# Patient Record
(demographics unavailable — no encounter records)

---

## 2024-11-22 NOTE — ASSESSMENT
[FreeTextEntry1] : Yosef is a 14 y/o male who presents for a follow up s/p diagnosed with ADHD 09/23/24 and starting Concerta.  He does not have school-based accommodations.  He  has been seeing psychiatrist Dr Vikash Pang for several months and has been seeing a therapist Allyssa for 1 year for anxiety and possible unspecified mood disorder. He has history of being bullied in school.  He has  some characteristics of ASD including severe peer difficulties and observed to have flat affect and make very poor eye contact.   We had discussed possible autism features (poor eye contact) versus anxiety symptoms.  Father/patient agreeable to discuss further with his therapist and psychiatrist.

## 2024-11-22 NOTE — QUALITY MEASURES
[Impairment in more than one setting] : Impairment in more than one setting: Yes [Coexisting conditions] : Coexisting conditions: Yes [Medication choices] : Medication choices: Not Applicable [Side effects of medications] : Side effects of medications: Not Applicable Ketoconazole Counseling:   Patient counseled regarding improving absorption with orange juice.  Adverse effects include but are not limited to breast enlargement, headache, diarrhea, nausea, upset stomach, liver function test abnormalities, taste disturbance, and stomach pain.  There is a rare possibility of liver failure that can occur when taking ketoconazole. The patient understands that monitoring of LFTs may be required, especially at baseline. The patient verbalized understanding of the proper use and possible adverse effects of ketoconazole.  All of the patient's questions and concerns were addressed.

## 2024-11-22 NOTE — PLAN
[FreeTextEntry1] : -Concerta  -Psychoeducation provided re: ADHD.   -Discussed management for ADHD including behavioral interventions and medication options. -Discussed stimulant medications; risks, benefits, possible side effects. -Letter to request 504 Plan for school previously provided -Sleep hygiene discussed. -Follow up with psychiatrist and therapist (i.e to work on social skills) -Discussed considering autism evaluation - Consider a neuropsychological evaluation in the future if needed (father spoke to Dr Leon). .   -Follow up in 1 month

## 2024-11-22 NOTE — HISTORY OF PRESENT ILLNESS
[FreeTextEntry1] : Yosef is a 12 y/o male here for a follow up s/p diagnosed with ADHD 09/23/24 and starting Concerta.  He does not have school-based accommodations.  He  has been seeing psychiatrist Dr Vikash Pang for several months and has been seeing a therapist Allyssa for 1 year for anxiety and possible unspecified mood disorder. He has history of being bullied in school.  He has  some characteristics of ASD including severe peer difficulties and observed to have flat affect and make very poor eye contact.   We had discussed possible autism features (poor eye contact) versus anxiety symptoms.  Father/patient agreeable to discuss further with his therapist and psychiatrist.    PLAN FROM PREVIOUS VISIT -Start Concerta 18 mg once daily in the morning.  May increase to 2 X18 mg after 1-2 weeks if not effective.  -Psychoeducation provided re: ADHD.  Resources for education given -Discussed management for ADHD including behavioral interventions and medication options. -Counseling on ADHD medication; stimulants are 1st line treatment. Discussed stimulant medications; risks, benefits, possible side effects. -Letter to request 504 Plan for school provided -Sleep hygiene discussed. -Follow up with psychiatrist and therapist (i.e to work on social skills) -Discussed considering autism evaluation - Consider a neuropsychological evaluation in the future if needed (father spoke to Dr Leon). .   -Follow up in 3 weeks.   INTERIM HPI   HPI FROM VISIT ON 09/23/24  Yosef is a 12 y/o male here for a follow up ADHD evaluation for behavioral and attention concerns.  He does not have school-based accommodations.  He  has been seeing psychiatrist Dr Vikash Pang for several months and has been seeing a therapist Allyssa for 1 year for anxiety and possible unspecified mood disorder. He has history of being bullied in school.  He has severe peer difficulties and observed to make very poor eye contact.   PLAN FROM PREVIOUS VISIT -Psychoeducation provided regarding possible ADHD diagnosis. -[employer] parent and teacher forms to be completed prior to next visit. (father may be able to obtain previous forms done this year) -Family asked to provide school report cards/progress reports -Discussed a neuropsychological evaluation can be requested by school district. -School letter to request neuropsychological testing provided.  -Sleep hygiene discussed. -Follow up with psychiatrist and therapist. -Discussed speaking with therapist re: his impression re: poor eye contact.  -Follow up in 1 ,month.  If meets criteria, father/patient expressed interest to consider ADHD meds)  INTERIM HPI Spoke to father 08/07/24 re neuropsych evaluation. Father reports he spoke to Dr Leon on the phone, who recommended to wait and see if a neuropsychological evaluation is needed as based on history he doesn't have concerns of a learning disability.    Patient reports he has started 8th grade and is already struggling with staying focused in class.  He wants to do well in school, expresses concerns he needs to do well now so he can have a good career.   Reji went to visit his mother in [place] for 1 month over the summer.    Denies feeling overly anxious, except "worries about some things."  Endorses he has not "seen things" in a few months.  He doesn't believe it was hallucinations. He saw shadows behind him when he was very anxious.   He is seeing a therapist regularly and has a follow up with his psychiatrist in October.   He is aware he doesn't make eye contact, and feels it just makes him uncomfortable.   He was open to working on this.   He does not believe he has autism but has considered it.     DEISI SCALES   PARENT: Father (Tej Zheng) Inattention: 4/9 (and 3 occasional) -negative/borderline Hyperactivity: 1/9 - ODD:   0/8 (5 occ) - CD:  1/14 - Anxiety/Depression:  /7 IMPAIRMENTS (score of 4 or 5)   Academic overall performance: No (score of 3= average)    Reading, No (score of 3= average)    Writing No (score of 3= average)    Math: NO (2= ABOVE AVERAGE)   Relationships with parents: No (score of 3= average)                                 siblings: YES (4= SOMEWHAT OF A PROBLEM)                                 peers: YES (4= SOMEWHAT OF A PROBLEM)                                 organized activities/teams: No (score of 3= average) TEACHER:  [employer] 05/24/24 Lula Abdullahi.  Same teacher, gave different scores to same questions, same date 05/24. [employer] Teacher, AND Teacher [employer] form Inattention:  7/9 + and 3/9 - (5 occasional) Hyperactivity:  3/9 - and 1/9 - Hyperactive:  1/9 - ODD/CD  0/10 - Anxiety/depression:   4/7 + POSITIVE IMPAIRMENTS IN ANY OF THE FOLLOWING (academic & Social Performance) 1.Reading (1=EXCELLENT, NO PROBLEM) 2 Writing NO (2= ABOVE AVERAGE) 3.Math (1=EXCELLENT, NO PROBLEM) 4. Relationships with peers YES (4= SOMEWHAT OF A PROBLEM) 5. Following directions YES (4= SOMEWHAT OF A PROBLEM) 6. Disrupting class No (score of 3= average) 7. Assignment completion No (score of 3= average) 8. Organizational skills. YES (4= SOMEWHAT OF A PROBLEM) TEACHER COMMENTS:  "REJI IS A VERY BRIGHT BOY. HE DOES A LOT OF HIS MATH WORK IN HIS HEAD AND MAKES CARELESS MISTAKES DUE TO NOT WANTING TO WRITE DOWN ALL OF HIS WORK. ALSO HE HAS AN EXCELLENT VOCABULARY AND CAN ARTICULATE HIS NEEDS AND WANTS AND ? (EXPLANAT) HE DOES NOT MAKE EYE CONTACT AND AT TIMES "ACTS OUT" FOR ATTENT. HE HAS NEVER BEEN RUDE OR DISRESPECTFUL TO ME. I DO FEEL THAT YOSEF IS EXTREMELY INTELLIGENT BUT DOES NOT COMPLETE HIS WORK AT TIMES."    HPI FROM VISIT ON 08/05/24  Yosef is a 12 y/o male here for an initial visit for inattention and behavioral concerns.   Patient lives with father and brother and attends [employer], mother lives in [place]. They visit infrequently.  He has 11 y/o brother with father, and 2 stepsiblings who live with mother.  Father is an  and has lived with Yosef since he was 5 years old.  He reports he has full custody.  Father was referred by his psychiatrist for a neuropsychological evaluation.   Although father was made aware this clinic is for ADHD evaluation, he endorses possible ADHD concerns, and that the psychiatrist is trying to "figure everything out."   Yosef has been seeing psychiatrist Dr Vikash Pang X 2 months and has been seeing a therapist Allyssa for 1 year.  He endorses was doing well in school until 6th grade, after he came back from visiting his mother in [place].  He was eating paper out of his notebook, was being bullied in school, did not want to go to school, he told therapist he was seeing things, and once verbalized his wanted to hurt his father.  He doesn't look people in the eye, which father felt was a not noticed before this time.   Father recalls Yosef was always a good student, and would report he would get distracted, talk in class. He sees the  at school once a week, but Yosef doesn't connect with her. On a long car ride his older brother noticed he always did a motion of wiping his hand on his face, though doesn't recall a history of tics.  Father feels he can't really tell if Reji has problems with focusing and multitasking at home.  He works all day and when he gets home he doesn't put a lot of demands on him.  Yosef was observed to not make eye contact, though was responsive to all questions asked.  He spoke in a steady, calm voice, and mood appeared depressed. He did not smile. He expressed he feels he had a hard time focusing in the class, and it was always hard to focus.  He gets easily distracted if people are talking.  He procrastinates but still tries to do what is asked for school. He does his homework before the class.  If he has an essay, he does it at home and needs to stay up late because he gets distracted and starts to play on-line chess or another activity.  Has a lot of missing assignments.  When taking a test, sometimes he finishes quickly, other times he needs an extra period to complete it.  Sometimes he feels overwhelmed and wants to throw his book down in frustration.   Unless he is "super interested" in a topic, it takes extra-long to do it.  He had  76% grade overall in last marking period, previously was 71%.  His best friend motivated him to do his work.   He feels it's hard to stay still.  When he is sitting down, he feels restless.  He is disorganized and it stresses him out to see things all over, and he wants to "restart" it.   He has a best in high school.  He is ok with just this friend because it's a good friend.  His friend has better social skills.  Yosef, endorse he doesn't look people in the eye because it makes him uncomfortable.  He is not longer overtly bullied but feels somewhat treated differently.  He denies sensitivities to sound or noise.  He feels how he could put himself in other people's shoes. He only feels bad for people he knows.  His principal told him to look him in the eye when he speaks to him.  Reji finds it very uncomfortable to look someone in the eye.     DEVELOPMENTAL HX  Child was born full term without complications and reached all age-appropriate developmental milestones without concerns. Early Intervention Services were not needed.   EDUCATIONAL HISTORY   Elementary School; Good student, no concerns. Middle school:  Grade started to decline, bullied in school.    HOME BEHAVIORS:   -Attention: Father denies noticing if patient is very easily distracted, needs frequent redirection, and has difficulty with multi-step instructions, however feels he places no demands on him.   -Hyperactivity: Fidgety -Impulsivity:  Is not aggressive, does not interrupts when others are speaking. -Organization: Has difficulty staying organized, often loses things.  His school bag is very disorganized.       CURRENT SCHOOL -School: [employer] , 8th grade in Sept  -Public school  -Special Ed services-No  504 plan or IEP - General education classroom -Accommodations- Counseling -Academic performance/grades: 76%   RELATIONSHIPS: Gets along well with peers, parents.   EMOTIONAL Hx of anxiety- sees psychiatrist and therapist.   SLEEP Hard to fall asleep, today fell asleep at 6 am.   ACTIVITIES/INTERESTS: Plays soccer Hangs out with friend Was in cub and boys scouts, stopped going this year Stopped being an alter .  He left [employer] and became [employer].   MEDICAL HISTORY: Denies a history of tics Denies history of starting spells, twitching, seizure-like activity.   FAMILY HISTORY: Family history of ADHD: Not diagnosed Family history of anxiety or depression:  Father- anxiety, mother-depression.  Paternal GF- schizophrenia. Denies family history of cardiac conditions or early unexplained deaths.